# Patient Record
Sex: MALE | Race: WHITE | Employment: UNEMPLOYED | URBAN - METROPOLITAN AREA
[De-identification: names, ages, dates, MRNs, and addresses within clinical notes are randomized per-mention and may not be internally consistent; named-entity substitution may affect disease eponyms.]

---

## 2017-01-11 RX ORDER — OXYCODONE HYDROCHLORIDE 5 MG/1
5 TABLET ORAL
Qty: 60 TAB | Refills: 0 | Status: SHIPPED | OUTPATIENT
Start: 2017-01-11 | End: 2017-01-17 | Stop reason: SDUPTHER

## 2017-01-11 NOTE — TELEPHONE ENCOUNTER
Last Visit: 10/18/2016 with MD Burton Thao    Next Appointment: 01/17/2017 with MD Burton Thao   Previous Refill Encounters: 11/22/2016 per NP Moses Matthews #[id number]     Requested Prescriptions     Pending Prescriptions Disp Refills    oxyCODONE IR (ROXICODONE) 5 mg immediate release tablet 60 Tab 0     Sig: Take 1 Tab by mouth two (2) times daily as needed for Pain. Max Daily Amount: 10 mg.

## 2017-01-17 ENCOUNTER — OFFICE VISIT (OUTPATIENT)
Dept: ORTHOPEDIC SURGERY | Age: 52
End: 2017-01-17

## 2017-01-17 VITALS
RESPIRATION RATE: 18 BRPM | WEIGHT: 208.8 LBS | TEMPERATURE: 97.9 F | BODY MASS INDEX: 27.67 KG/M2 | SYSTOLIC BLOOD PRESSURE: 131 MMHG | OXYGEN SATURATION: 98 % | DIASTOLIC BLOOD PRESSURE: 77 MMHG | HEART RATE: 80 BPM | HEIGHT: 73 IN

## 2017-01-17 DIAGNOSIS — M96.1 LUMBAR POST-LAMINECTOMY SYNDROME: Primary | ICD-10-CM

## 2017-01-17 RX ORDER — DULOXETIN HYDROCHLORIDE 60 MG/1
60 CAPSULE, DELAYED RELEASE ORAL
Qty: 30 CAP | Refills: 3 | Status: SHIPPED | OUTPATIENT
Start: 2017-01-17

## 2017-01-17 RX ORDER — OXYCODONE HYDROCHLORIDE 5 MG/1
5 TABLET ORAL
Qty: 60 TAB | Refills: 0 | Status: SHIPPED | OUTPATIENT
Start: 2017-02-10 | End: 2017-01-17 | Stop reason: SDUPTHER

## 2017-01-17 RX ORDER — CARISOPRODOL 350 MG/1
350 TABLET ORAL
Qty: 90 TAB | Refills: 2 | Status: SHIPPED | OUTPATIENT
Start: 2017-01-17

## 2017-01-17 RX ORDER — OXYCODONE HYDROCHLORIDE 5 MG/1
5 TABLET ORAL
Qty: 60 TAB | Refills: 0 | Status: SHIPPED | OUTPATIENT
Start: 2017-04-11

## 2017-01-17 RX ORDER — OXYCODONE HYDROCHLORIDE 5 MG/1
5 TABLET ORAL
Qty: 60 TAB | Refills: 0 | Status: SHIPPED | OUTPATIENT
Start: 2017-03-12

## 2017-01-17 NOTE — PATIENT INSTRUCTIONS
Learning About How to Have a Healthy Back  What causes back pain? Back pain is often caused by overuse, strain, or injury. For example, people often hurt their backs playing sports or working in the yard, being jolted in a car accident, or lifting something too heavy. Aging plays a part too. Your bones and muscles tend to lose strength as you age, which makes injury more likely. The spongy discs between the bones of the spine (vertebrae) may suffer from wear and tear and no longer provide enough cushion between the bones. A disc that bulges or breaks open (herniated disc) can press on nerves, causing back pain. In some people, back pain is the result of arthritis, broken vertebrae caused by bone loss (osteoporosis), illness, or a spine problem. Although most people have back pain at one time or another, there are steps you can take to make it less likely. How can you have a healthy back? Reduce stress on your back through good posture  Slumping or slouching alone may not cause low back pain. But after the back has been strained or injured, bad posture can make pain worse. · Sleep in a position that maintains your back's normal curves and on a mattress that feels comfortable. Sleep on your side with a pillow between your knees, or sleep on your back with a pillow under your knees. These positions can reduce strain on your back. · Stand and sit up straight. \"Good posture\" generally means your ears, shoulders, and hips are in a straight line. · If you must stand for a long time, put one foot on a stool, ledge, or box. Switch feet every now and then. · Sit in a chair that is low enough to let you place both feet flat on the floor with both knees nearly level with your hips. If your chair or desk is too high, use a footrest to raise your knees. Place a small pillow, a rolled-up towel, or a lumbar roll in the curve of your back if you need extra support.   · Try a kneeling chair, which helps tilt your hips forward. This takes pressure off your lower back. · Try sitting on an exercise ball. It can rock from side to side, which helps keep your back loose. · When driving, keep your knees nearly level with your hips. Sit straight, and drive with both hands on the steering wheel. Your arms should be in a slightly bent position. Reduce stress on your back through careful lifting  · Squat down, bending at the hips and knees only. If you need to, put one knee to the floor and extend your other knee in front of you, bent at a right angle (half kneeling). · Press your chest straight forward. This helps keep your upper back straight while keeping a slight arch in your low back. · Hold the load as close to your body as possible, at the level of your belly button (navel). · Use your feet to change direction, taking small steps. · Lead with your hips as you change direction. Keep your shoulders in line with your hips as you move. · Set down your load carefully, squatting with your knees and hips only. Exercise and stretch your back  · Do some exercise on most days of the week, if your doctor says it is okay. You can walk, run, swim, or cycle. · Stretch your back muscles. Here are a few exercises to try:  Garjavier Cookver on your back, and gently pull one bent knee to your chest. Put that foot back on the floor, and then pull the other knee to your chest.  ¨ Do pelvic tilts. Lie on your back with your knees bent. Tighten your stomach muscles. Pull your belly button (navel) in and up toward your ribs. You should feel like your back is pressing to the floor and your hips and pelvis are slightly lifting off the floor. Hold for 6 seconds while breathing smoothly. ¨ Sit with your back flat against a wall. · Keep your core muscles strong. The muscles of your back, belly (abdomen), and buttocks support your spine. ¨ Pull in your belly and imagine pulling your navel toward your spine. Hold this for 6 seconds, then relax.  Remember to keep breathing normally as you tense your muscles. ¨ Do curl-ups. Always do them with your knees bent. Keep your low back on the floor, and curl your shoulders toward your knees using a smooth, slow motion. Keep your arms folded across your chest. If this bothers your neck, try putting your hands behind your neck (not your head), with your elbows spread apart. ¨ Lie on your back with your knees bent and your feet flat on the floor. Tighten your belly muscles, and then push with your feet and raise your buttocks up a few inches. Hold this position 6 seconds as you continue to breathe normally, then lower yourself slowly to the floor. Repeat 8 to 12 times. ¨ If you like group exercise, try Pilates or yoga. These classes have poses that strengthen the core muscles. Lead a healthy lifestyle  · Stay at a healthy weight to avoid strain on your back. · Do not smoke. Smoking increases the risk of osteoporosis, which weakens the spine. If you need help quitting, talk to your doctor about stop-smoking programs and medicines. These can increase your chances of quitting for good. Where can you learn more? Go to http://efrain-tiny.info/. Enter L315 in the search box to learn more about \"Learning About How to Have a Healthy Back. \"  Current as of: May 23, 2016  Content Version: 11.1  © 8869-2615 [employer]. Care instructions adapted under license by [employer] (which disclaims liability or warranty for this information). If you have questions about a medical condition or this instruction, always ask your healthcare professional. [place] [address] any warranty or liability for your use of this information.

## 2017-01-17 NOTE — PROGRESS NOTES
[address]  [address]  [place]  Phone: [phone]  Fax: [phone]  PROGRESS NOTE  Patient: Joseluis Woods                MRN: [id number]       SSN: [id number]  YOB: 1965        AGE: 46 y.o. SEX: male  Body mass index is 27.55 kg/(m^2). PCP: Williams Gomez MD  01/17/17    Chief Complaint   Patient presents with    Back Pain     3 month follow up       [facility], RADIOGRAPHS, and PLAN:     HISTORY:  Mr. Mary Nice returns today. He completed his functional capacity evaluation, which demonstrated excellent effort and the capacity to do heavy work, just not the degree of heavy work required by his job. I have given him a work release for heavy work at a 60-pound lifting limit. He continues on his narcotic pain medication. We have gotten him down to two pills a day. He still has days where he feels he requires more than that, but, on average, we got him down to two pills a day and will try to continue to wean him down on his narcotics over time. We do not like to provide chronic narcotic pain medications, and if the weaning process is not successful, we may have him in to chronic pain management. In regards to his work status, he has a degree in IT, and I think that would be best for him. I probably would recommend vocational rehabilitation to try to get him job placement in the IT field. That would be ideal for him and most easily placed within his job work capacity. We will see him again as-needed for his medication management.      cc:  Workers Compensation        Past Medical History   Diagnosis Date    Back pain     Low back pain     Lumbar postlaminectomy syndrome        Family History   Problem Relation Age of Onset    Diabetes Father        Current Outpatient Prescriptions   Medication Sig Dispense Refill    diclofenac (FLECTOR) 1.3 % pt12 1 Patch by TransDERmal route every twelve (12) hours every twelve (12) hours. 60 Patch 2    naproxen (NAPROSYN) 500 mg tablet take 1 tablet by mouth twice a day  0    DULoxetine (CYMBALTA) 60 mg capsule Take 1 Cap by mouth nightly. Indications: NEUROPATHIC PAIN 30 Cap 3    pregabalin (LYRICA) 150 mg capsule Take 1 Cap by mouth two (2) times a day. Max Daily Amount: 300 mg. 60 Cap 0    CIALIS 20 mg tablet   0    carisoprodol (SOMA) 350 mg tablet Take 1 Tab by mouth every eight (8) hours as needed for Muscle Spasm(s). Max Daily Amount: 1,050 mg. Indications: MUSCLE SPASM 90 Tab 2    topiramate (TOPAMAX) 50 mg tablet Take 1 Tab by mouth two (2) times a day. 60 Tab 2    VIAGRA 100 mg tablet   6    DULoxetine (CYMBALTA) 60 mg capsule Take 1 Cap by mouth daily. 30 Cap 2    oxyCODONE (OXYIR) 5 mg capsule 1 tab PO [id number] prn pain. .. DNF 4-29-16 90 Cap 0    DULoxetine (CYMBALTA) 60 mg capsule Take 1 Cap by mouth daily. 30 Cap 2    oxyCODONE IR (ROXICODONE) 10 mg tab immediate release tablet Take 0.5 Tabs by mouth every eight (8) hours as needed for Pain. Max Daily Amount: 15 mg. 90 Tab 0    oxyCODONE-acetaminophen (PERCOCET) 5-325 mg per tablet Take 1 Tab by mouth three (3) times daily as needed for Pain.  methylphenidate (RITALIN) 10 mg tablet Take  by mouth daily.  DULoxetine (CYMBALTA) 20 mg capsule Take 20 mg by mouth daily. Indications: CHRONIC MUSCULOSKELETAL PAIN      diclofenac (FLECTOR) 1.3 % pt12 transdermal patch 1 Patch by TransDERmal route every twelve (12) hours every twelve (12) hours.  oxyCODONE IR (ROXICODONE) 5 mg immediate release tablet Take 1 Tab by mouth two (2) times daily as needed for Pain. Max Daily Amount: 10 mg. 60 Tab 0    carisoprodol (SOMA) 350 mg tablet Take 1 Tab by mouth every eight (8) hours as needed for Muscle Spasm(s). Max Daily Amount: 1,050 mg. 90 Tab 2    DULoxetine (CYMBALTA) 30 mg capsule Take 1 Cap by mouth daily.  Indications: NEUROPATHIC PAIN 30 Cap 3    pregabalin (LYRICA) 75 mg capsule Take 1 Cap by mouth two (2) times a day. Max Daily Amount: 150 mg. 14 Cap 0    oxyCODONE IR (ROXICODONE) 5 mg immediate release tablet Take 1 Tab by mouth every four (4) hours as needed for Pain. Max Daily Amount: 30 mg. 90 Tab 0    oxyCODONE (OXYIR) 5 mg capsule Take 1 Cap by mouth every four (4) hours as needed. Max Daily Amount: 30 mg. 90 Cap 0    oxyCODONE (OXYIR) 5 mg capsule 1 tab PO [id number] prn pain. .. DNF 3-20-16 90 Cap 0    oxyCODONE (OXYIR) 5 mg capsule 1 tab PO [id number] prn pain. .. DNF 5-28-16 90 Cap 0    oxyCODONE IR (ROXICODONE) 5 mg immediate release tablet Take 1 Tab by mouth every eight (8) hours as needed for Pain. Max Daily Amount: 15 mg. 90 Tab 0    diclofenac (FLECTOR) 1.3 % pt12 transdermal patch 1 Patch by TransDERmal route every twelve (12) hours. 60 Patch 2       Allergies   Allergen Reactions    Codeine Anaphylaxis    Tylenol [Acetaminophen] Anaphylaxis    Penicillins Rash       Past Surgical History   Procedure Laterality Date    Hx orthopaedic Right L5 Laminectomy     2011       Past Medical History   Diagnosis Date    Back pain     Low back pain     Lumbar postlaminectomy syndrome        Social History     Social History    Marital status: LEGALLY      Spouse name: N/A    Number of children: N/A    Years of education: N/A     Occupational History    Not on file. Social History Main Topics    Smoking status: Former Smoker    Smokeless tobacco: Never Used    Alcohol use Yes      Comment: occasionally    Drug use: No    Sexual activity: Not on file     Other Topics Concern    Not on file     Social History Narrative       REVIEW OF SYSTEMS:   CONSTITUTIONAL SYMPTOMS:  Negative. EYES:  Negative. EARS, NOSE, THROAT AND MOUTH:  Negative. CARDIOVASCULAR:  Negative. RESPIRATORY:  Negative. GENITOURINARY: Negative. GASTROINTESTINAL:  Negative. INTEGUMENTARY (SKIN AND/OR BREAST):  Negative.    MUSCULOSKELETAL: Per HPI.   ENDOCRINE/RHEUMATOLOGIC: Negative. NEUROLOGICAL:  Per HPI. HEMATOLOGIC/LYMPHATIC:  Negative. ALLERGIC/IMMUNOLOGIC:  Negative. PSYCHIATRIC:  Negative. PHYSICAL EXAMINATION:   Visit Vitals    /77    Pulse 80    Temp 97.9 °F (36.6 °C) (Oral)    Resp 18    Ht 6' 1\" (1.854 m)    Wt 208 lb 12.8 oz (94.7 kg)    SpO2 98%    BMI 27.55 kg/m2    PAIN SCALE: 8/10    CONSTITUTIONAL: The patient is in no apparent distress and is alert and oriented x 3. HEENT: Normocephalic. Hearing grossly intact. NECK: Supple and symmetric. no tenderness, or masses were felt. RESPIRATORY: No labored breathing. CARDIOVASCULAR: The carotid pulses were normal. Peripheral pulses were 2+. CHEST: Normal AP diameter and normal contour without any kyphoscoliosis. LYMPHATIC: No lymphadenopathy was appreciated in the neck, axillae or groin. SKIN:  Negative for scars, rashes, lesions, or ulcers on the right upper, right lower, left upper, left lower and trunk. NEUROLOGICAL: Alert and oriented x 3. Ambulation without assistive device. FWB. EXTREMITIES: See musculoskeletal.  MUSCULOSKELETAL:   Head and Neck:  Negative for misalignment, asymmetry, crepitation, defects, tenderness masses or effusions.  Left Upper Extremity: Inspection, percussion and palpation preformed. Vergaras sign is negative.  Right Upper Extremity: Inspection, percussion and palpation preformed. Vergaras sign is negative.  Spine, Ribs and Pelvis: Inspection, percussion and palpation preformed. Negative for misalignment, asymmetry, crepitation, defects, tenderness masses or effusions.  Right Lower Extremity: Inspection, percussion and palpation preformed. Negative straight leg raise.  Left Lower Extremity: Inspection, percussion and palpation preformed. Negative straight leg raise.      Hip Flex  Quads Hamstrings Tib-Ant EHL Ankle PF/DF   Right +4/5 +4/5 +4/5 +4/5 +4/5 +4/5   Left +4/5 +4/5 +4/5 +4/5 +4/5 +4/5     SPINE EXAM:     Lumbar spine: No rash, ecchymosis, or gross obliquity. No focal atrophy is noted.        ASSESSMENT    ICD-10-CM ICD-9-CM    1. Lumbar post-laminectomy syndrome M96.1 722.83        Written by Stiven Espana, as dictated by Angela Espana MD.

## 2017-01-17 NOTE — MR AVS SNAPSHOT
Visit Information Date & Time Provider Department Dept. Phone Encounter #  
 1/17/2017  8:45 AM Jadiel Varela  [facility], [address] [facility] [id number] Follow-up Instructions Follow-up and Disposition History Your Appointments 4/13/2017  9:20 AM  
Follow Up with Zay Lobo NP  
[facility] ([facility]) [address] [facility] [address]  
[phone]  
  
   
 [address] [facility] [address] Upcoming Health Maintenance Date Due Hepatitis C Screening 1965 DTaP/Tdap/Td series (1 - Tdap) 7/6/1984 FOBT Q 1 YEAR AGE 50-75 7/6/2013 INFLUENZA AGE 9 TO ADULT 8/1/2016 Allergies as of 1/17/2017  Review Complete On: 1/17/2017 By: Jadiel Varela MD  
  
 Severity Noted Reaction Type Reactions Codeine High 03/03/2016    Anaphylaxis Tylenol [Acetaminophen] High   Anaphylaxis Penicillins  03/12/2015    Rash Current Immunizations  Never Reviewed No immunizations on file. Not reviewed this visit You Were Diagnosed With   
  
 Codes Comments Lumbar post-laminectomy syndrome    -  Primary ICD-10-CM: M96.1 ICD-9-CM: 722.83 Vitals BP Pulse Temp Resp Height(growth percentile) Weight(growth percentile) 131/77 80 97.9 °F (36.6 °C) (Oral) 18 6' 1\" (1.854 m) 208 lb 12.8 oz (94.7 kg) SpO2 BMI Smoking Status 98% 27.55 kg/m2 Former Smoker BMI and BSA Data Body Mass Index Body Surface Area  
 27.55 kg/m 2 2.21 m 2 Preferred Pharmacy Pharmacy Name Phone [facility] [address] [phone] Your Updated Medication List  
  
   
This list is accurate as of: 1/17/17  9:29 AM.  Always use your most recent med list.  
  
  
  
  
 * carisoprodol 350 mg tablet Commonly known as:  SOMA Take 1 Tab by mouth every eight (8) hours as needed for Muscle Spasm(s). Max Daily Amount: 1,050 mg. Indications: MUSCLE SPASM * carisoprodol 350 mg tablet Commonly known as:  SOMA Take 1 Tab by mouth every eight (8) hours as needed for Muscle Spasm(s). Max Daily Amount: 1,050 mg. CIALIS 20 mg tablet Generic drug:  tadalafil * CYMBALTA 20 mg capsule Generic drug:  DULoxetine Take 20 mg by mouth daily. Indications: CHRONIC MUSCULOSKELETAL PAIN  
  
 * DULoxetine 60 mg capsule Commonly known as:  CYMBALTA Take 1 Cap by mouth daily. * DULoxetine 60 mg capsule Commonly known as:  CYMBALTA Take 1 Cap by mouth daily. * DULoxetine 30 mg capsule Commonly known as:  CYMBALTA Take 1 Cap by mouth daily. Indications: NEUROPATHIC PAIN  
  
 * DULoxetine 60 mg capsule Commonly known as:  CYMBALTA Take 1 Cap by mouth nightly. Indications: NEUROPATHIC PAIN  
  
 * diclofenac 1.3 % Pt12 Commonly known as:  FLECTOR  
1 Patch by TransDERmal route every twelve (12) hours every twelve (12) hours. * diclofenac 1.3 % Pt12 Commonly known as:  FLECTOR  
1 Patch by TransDERmal route every twelve (12) hours. * diclofenac 1.3 % Pt12 Commonly known as:  FLECTOR  
1 Patch by TransDERmal route every twelve (12) hours every twelve (12) hours. naproxen 500 mg tablet Commonly known as:  NAPROSYN  
take 1 tablet by mouth twice a day * oxyCODONE IR 5 mg immediate release tablet Commonly known as:  Karalee Ferries Take 1 Tab by mouth every eight (8) hours as needed for Pain. Max Daily Amount: 15 mg.  
  
 * oxyCODONE IR 10 mg Tab immediate release tablet Commonly known as:  Karalee Ferries Take 0.5 Tabs by mouth every eight (8) hours as needed for Pain. Max Daily Amount: 15 mg.  
  
 * oxyCODONE 5 mg capsule Commonly known as:  OXYIR  
1 tab PO [id number] prn pain. .. DNF 4-29-16 * oxyCODONE 5 mg capsule Commonly known as:  OXYIR  
 1 tab PO [id number] prn pain. .. DNF 5-28-16 * oxyCODONE 5 mg capsule Commonly known as:  OXYIR  
1 tab PO [id number] prn pain. .. DNF 3-20-16 * oxyCODONE IR 5 mg immediate release tablet Commonly known as:  Royetta Mariel Take 1 Tab by mouth every four (4) hours as needed for Pain. Max Daily Amount: 30 mg.  
  
 * oxyCODONE 5 mg capsule Commonly known as:  OXYIR Take 1 Cap by mouth every four (4) hours as needed. Max Daily Amount: 30 mg.  
  
 * oxyCODONE IR 5 mg immediate release tablet Commonly known as:  Royetta Mariel Take 1 Tab by mouth two (2) times daily as needed for Pain. Max Daily Amount: 10 mg. Start taking on:  3/12/2017 * oxyCODONE IR 5 mg immediate release tablet Commonly known as:  Royetta Mariel Take 1 Tab by mouth two (2) times daily as needed for Pain. Max Daily Amount: 10 mg. Start taking on:  4/11/2017  
  
 oxyCODONE-acetaminophen 5-325 mg per tablet Commonly known as:  PERCOCET Take 1 Tab by mouth three (3) times daily as needed for Pain. * pregabalin 75 mg capsule Commonly known as:  Walda Smoker Take 1 Cap by mouth two (2) times a day. Max Daily Amount: 150 mg.  
  
 * pregabalin 150 mg capsule Commonly known as:  Walda Smoker Take 1 Cap by mouth two (2) times a day. Max Daily Amount: 300 mg. RITALIN 10 mg tablet Generic drug:  methylphenidate Take  by mouth daily. topiramate 50 mg tablet Commonly known as:  TOPAMAX Take 1 Tab by mouth two (2) times a day. VIAGRA 100 mg tablet Generic drug:  sildenafil citrate * Notice: This list has 21 medication(s) that are the same as other medications prescribed for you. Read the directions carefully, and ask your doctor or other care provider to review them with you. Prescriptions Printed Refills  
 carisoprodol (SOMA) 350 mg tablet 2 Sig: Take 1 Tab by mouth every eight (8) hours as needed for Muscle Spasm(s). Max Daily Amount: 1,050 mg.  
 Class: Print  Route: Oral  
 oxyCODONE IR (ROXICODONE) 5 mg immediate release tablet 0 Starting on: 3/12/2017 Sig: Take 1 Tab by mouth two (2) times daily as needed for Pain. Max Daily Amount: 10 mg.  
 Class: Print Route: Oral  
 oxyCODONE IR (ROXICODONE) 5 mg immediate release tablet 0 Starting on: 4/11/2017 Sig: Take 1 Tab by mouth two (2) times daily as needed for Pain. Max Daily Amount: 10 mg.  
 Class: Print Route: Oral  
  
Prescriptions Sent to Pharmacy Refills DULoxetine (CYMBALTA) 60 mg capsule 3 Sig: Take 1 Cap by mouth nightly. Indications: NEUROPATHIC PAIN Class: Normal  
 Pharmacy: [facility] [address]  #: [phone] Route: Oral  
  
Patient Instructions Learning About How to Have a Healthy Back What causes back pain? Back pain is often caused by overuse, strain, or injury. For example, people often hurt their backs playing sports or working in the yard, being jolted in a car accident, or lifting something too heavy. Aging plays a part too. Your bones and muscles tend to lose strength as you age, which makes injury more likely. The spongy discs between the bones of the spine (vertebrae) may suffer from wear and tear and no longer provide enough cushion between the bones. A disc that bulges or breaks open (herniated disc) can press on nerves, causing back pain. In some people, back pain is the result of arthritis, broken vertebrae caused by bone loss (osteoporosis), illness, or a spine problem. Although most people have back pain at one time or another, there are steps you can take to make it less likely. How can you have a healthy back? Reduce stress on your back through good posture Slumping or slouching alone may not cause low back pain. But after the back has been strained or injured, bad posture can make pain worse.  
· Sleep in a position that maintains your back's normal curves and on a mattress that feels comfortable. Sleep on your side with a pillow between your knees, or sleep on your back with a pillow under your knees. These positions can reduce strain on your back. · Stand and sit up straight. \"Good posture\" generally means your ears, shoulders, and hips are in a straight line. · If you must stand for a long time, put one foot on a stool, ledge, or box. Switch feet every now and then. · Sit in a chair that is low enough to let you place both feet flat on the floor with both knees nearly level with your hips. If your chair or desk is too high, use a footrest to raise your knees. Place a small pillow, a rolled-up towel, or a lumbar roll in the curve of your back if you need extra support. · Try a kneeling chair, which helps tilt your hips forward. This takes pressure off your lower back. · Try sitting on an exercise ball. It can rock from side to side, which helps keep your back loose. · When driving, keep your knees nearly level with your hips. Sit straight, and drive with both hands on the steering wheel. Your arms should be in a slightly bent position. Reduce stress on your back through careful lifting · Squat down, bending at the hips and knees only. If you need to, put one knee to the floor and extend your other knee in front of you, bent at a right angle (half kneeling). · Press your chest straight forward. This helps keep your upper back straight while keeping a slight arch in your low back. · Hold the load as close to your body as possible, at the level of your belly button (navel). · Use your feet to change direction, taking small steps. · Lead with your hips as you change direction. Keep your shoulders in line with your hips as you move. · Set down your load carefully, squatting with your knees and hips only. Exercise and stretch your back · Do some exercise on most days of the week, if your doctor says it is okay. You can walk, run, swim, or cycle. · Stretch your back muscles. Here are a few exercises to try: ¨ Lie on your back, and gently pull one bent knee to your chest. Put that foot back on the floor, and then pull the other knee to your chest. 
¨ Do pelvic tilts. Lie on your back with your knees bent. Tighten your stomach muscles. Pull your belly button (navel) in and up toward your ribs. You should feel like your back is pressing to the floor and your hips and pelvis are slightly lifting off the floor. Hold for 6 seconds while breathing smoothly. ¨ Sit with your back flat against a wall. · Keep your core muscles strong. The muscles of your back, belly (abdomen), and buttocks support your spine. ¨ Pull in your belly and imagine pulling your navel toward your spine. Hold this for 6 seconds, then relax. Remember to keep breathing normally as you tense your muscles. ¨ Do curl-ups. Always do them with your knees bent. Keep your low back on the floor, and curl your shoulders toward your knees using a smooth, slow motion. Keep your arms folded across your chest. If this bothers your neck, try putting your hands behind your neck (not your head), with your elbows spread apart. ¨ Lie on your back with your knees bent and your feet flat on the floor. Tighten your belly muscles, and then push with your feet and raise your buttocks up a few inches. Hold this position 6 seconds as you continue to breathe normally, then lower yourself slowly to the floor. Repeat 8 to 12 times. ¨ If you like group exercise, try Pilates or yoga. These classes have poses that strengthen the core muscles. Lead a healthy lifestyle · Stay at a healthy weight to avoid strain on your back. · Do not smoke. Smoking increases the risk of osteoporosis, which weakens the spine. If you need help quitting, talk to your doctor about stop-smoking programs and medicines. These can increase your chances of quitting for good. Where can you learn more? Go to http://efrain-tiny.info/. Enter L315 in the search box to learn more about \"Learning About How to Have a Healthy Back. \" Current as of: May 23, 2016 Content Version: 11.1 © 1543-0371 [employer]. Care instructions adapted under license by [employer] (which disclaims liability or warranty for this information). If you have questions about a medical condition or this instruction, always ask your healthcare professional. [address] any warranty or liability for your use of this information. Patient Instructions History Introducing [facility]! Cassia Tamayo introduces Gazelle patient portal. Now you can access parts of your medical record, email your doctor's office, and request medication refills online. 1. In your internet browser, go to [email or web address] 2. Click on the First Time User? Click Here link in the Sign In box. You will see the New Member Sign Up page. 3. Enter your Gazelle Access Code exactly as it appears below. You will not need to use this code after youve completed the sign-up process. If you do not sign up before the expiration date, you must request a new code. · Gazelle Access Code: [email or web address] Expires: 4/17/2017  8:35 AM 
 
4. Enter the last four digits of your Social Security Number (xxxx) and Date of Birth (mm/dd/yyyy) as indicated and click Submit. You will be taken to the next sign-up page. 5. Create a myeasydocst ID. This will be your Gazelle login ID and cannot be changed, so think of one that is secure and easy to remember. 6. Create a Gazelle password. You can change your password at any time. 7. Enter your Password Reset Question and Answer. This can be used at a later time if you forget your password. 8. Enter your e-mail address. You will receive e-mail notification when new information is available in [address] 9. Click Sign Up. You can now view and download portions of your medical record. 10. Click the Download Summary menu link to download a portable copy of your medical information. If you have questions, please visit the Frequently Asked Questions section of the "[employer] website. Remember, "[employer] is NOT to be used for urgent needs. For medical emergencies, dial 911. Now available from your iPhone and Android! Please provide this summary of care documentation to your next provider. Your primary care clinician is listed as Anupama Epps. If you have any questions after today's visit, please call [phone].

## 2017-01-17 NOTE — LETTER
1/17/2017 9:10 AM 
 
Mr. Alyssa Cooper 
[phone] [place] [phone] To Whom It May Concern: 
 
Alyssa Cooper is currently under the care of [facility]. 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 If there are questions or concerns please have the patient contact our office.  
 
 
 
Sincerely, 
 
 
 
 
 
Michele Reyes MD

## 2017-04-13 ENCOUNTER — OFFICE VISIT (OUTPATIENT)
Dept: ORTHOPEDIC SURGERY | Age: 52
End: 2017-04-13

## 2017-04-13 VITALS
HEIGHT: 73 IN | SYSTOLIC BLOOD PRESSURE: 107 MMHG | TEMPERATURE: 97.8 F | OXYGEN SATURATION: 99 % | DIASTOLIC BLOOD PRESSURE: 60 MMHG | WEIGHT: 202.4 LBS | BODY MASS INDEX: 26.83 KG/M2 | HEART RATE: 81 BPM | RESPIRATION RATE: 18 BRPM

## 2017-04-13 DIAGNOSIS — M96.1 LUMBAR POST-LAMINECTOMY SYNDROME: Primary | ICD-10-CM

## 2017-04-13 DIAGNOSIS — Z51.81 THERAPEUTIC DRUG MONITORING: ICD-10-CM

## 2017-04-13 DIAGNOSIS — M96.1 LUMBAR POST-LAMINECTOMY SYNDROME: ICD-10-CM

## 2017-04-13 DIAGNOSIS — M51.26 HERNIATED NUCLEUS PULPOSUS, L4-5 RIGHT: ICD-10-CM

## 2017-04-13 RX ORDER — DICLOFENAC EPOLAMINE 0.01 G/1
1 PATCH TOPICAL EVERY 12 HOURS
Qty: 60 PATCH | Refills: 2 | Status: SHIPPED | OUTPATIENT
Start: 2017-04-13

## 2017-04-13 RX ORDER — DULOXETIN HYDROCHLORIDE 30 MG/1
30 CAPSULE, DELAYED RELEASE ORAL DAILY
Qty: 30 CAP | Refills: 2 | Status: SHIPPED | OUTPATIENT
Start: 2017-04-13

## 2017-04-13 NOTE — PROGRESS NOTES
Chief complaint/History of Present Illness:  Chief Complaint   Patient presents with    Back Pain     3 month follow up     HPI  Dafne Senior is a  46 y.o.  male      HISTORY OF PRESENT ILLNESS:  The patient comes in today for followup of his chronic back pain. He gets pain that radiates to his left lower extremity. He is on Cymbalta 60 mg daily, which does help some. He is also on Soma twice a day and Roxicodone IR 5 mg three twice a day. 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 He does not have to do any heavy lifting, but the standing does aggravate his pain. He denies any fever or bowel or bladder dysfunction. He does ride a motorcycle. His SCV did show he could do heavy work up to 60 pounds. He is a Workers Compensation injury from years ago. 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 However, he has not been able to find a job in that yet, but he is still looking. PHYSICAL EXAM:  Mr. Олег Don is a 78-year-old male. He is alert and oriented. He has a full weight bearing, non-antalgic gait, 5/5 strength of the bilateral lower extremities, and negative straight leg raise. ASSESSENT/PLAN:  This is a patient with post lumbar laminectomy syndrome. He has not been able to get off the Oxy IR like we were hoping, so we are going to refer him to pain management at this time. We will get a UDS today. His last dose was last Thursday, so it is not really going to show up in his system, but we will go ahead and get the UDS regardless. He has a refill of Roxicodone waiting for him at the pharmacy, so we are not going to give him one today. We are refilling his Cymbalta and his Diclofenac patches. They do seem to help. We are going to refer him to Dr. Sophie Case. The patient lives in [place], so that should be a good place for him to go.   If he is unable to get into pain management within three months, we will see him back in three months. Review of systems:    Past Medical History:   Diagnosis Date    Back pain     Low back pain     Lumbar postlaminectomy syndrome      Past Surgical History:   Procedure Laterality Date    HX ORTHOPAEDIC Right L5 Laminectomy    2011     Social History     Social History    Marital status: LEGALLY      Spouse name: N/A    Number of children: N/A    Years of education: N/A     Occupational History    Not on file. Social History Main Topics    Smoking status: Former Smoker    Smokeless tobacco: Never Used    Alcohol use Yes      Comment: occasionally    Drug use: No    Sexual activity: Not on file     Other Topics Concern    Not on file     Social History Narrative     Family History   Problem Relation Age of Onset    Diabetes Father        Physical Exam:  Visit Vitals    /60    Pulse 81    Temp 97.8 °F (36.6 °C) (Oral)    Resp 18    Ht 6' 1\" (1.854 m)    Wt 202 lb 6.4 oz (91.8 kg)    SpO2 99%    BMI 26.7 kg/m2     Pain Scale: 6/10     has been . reviewed and is appropriate    Pt has a consistent UDS in the record      Myranda Olsen was seen today for back pain. Diagnoses and all orders for this visit:    Lumbar post-laminectomy syndrome  -     REFERRAL TO PAIN MANAGEMENT  -     DRUG SCREEN UR - W/ CONFIRM; Future    Therapeutic drug monitoring  -     REFERRAL TO PAIN MANAGEMENT  -     DRUG SCREEN UR - W/ CONFIRM; Future    Herniated nucleus pulposus, L4-5 right  -     diclofenac (FLECTOR) 1.3 % pt12; 1 Patch by TransDERmal route every twelve (12) hours every twelve (12) hours. Other orders  -     DULoxetine (CYMBALTA) 30 mg capsule; Take 1 Cap by mouth daily. Indications: NEUROPATHIC PAIN            Follow-up Disposition:  Return in about 3 months (around 7/13/2017) for with [address] [place] [address].         We have informed [place] to notify us for immediate appointment if he has any worsening neurogical symptoms or if an emergency situation presents, then call 911

## 2017-04-18 ENCOUNTER — TELEPHONE (OUTPATIENT)
Dept: ORTHOPEDIC SURGERY | Age: 52
End: 2017-04-18

## 2017-04-18 NOTE — TELEPHONE ENCOUNTER
Received notice from his W/C that the referral to Pain Management has been denied. His claim from W/C has been Pema dolan on a full and final basis. No further medical will be paid under this file. \" This was received from [facility] from [facility]

## 2017-06-27 NOTE — TELEPHONE ENCOUNTER
Last Visit: 04/13/2017 with NP Aaron Domínguez    Next Appointment: noted to f/u in 3 months if unable to get into pain management  Previous Refill Encounters: 04/11/2017 per MD Kong Pizarro #[id number]     Requested Prescriptions     Pending Prescriptions Disp Refills    oxyCODONE IR (ROXICODONE) 5 mg immediate release tablet 60 Tab 0     Sig: Take 1 Tab by mouth two (2) times daily as needed for Pain. Max Daily Amount: 10 mg.

## 2017-06-28 RX ORDER — OXYCODONE HYDROCHLORIDE 5 MG/1
5 TABLET ORAL
Qty: 60 TAB | Refills: 0 | OUTPATIENT
Start: 2017-06-28

## 2019-09-05 ENCOUNTER — TELEPHONE (OUTPATIENT)
Dept: ORTHOPEDIC SURGERY | Age: 54
End: 2019-09-05

## 2019-09-05 NOTE — TELEPHONE ENCOUNTER
Patient called stating that he needs a letter for his new job that out of town stating that he has been prescribed Cymbalta, Oxycodone, Oxycodone IR, and Carisoprodol in the past. He said needs this note because he is having a drug screening test that goes back a couple years and just wants to be safe if the test comes up with any of the medication. He needs this completed by today so he can pick it up. Please advise patient at [phone] when ready for .

## 2025-05-05 NOTE — TELEPHONE ENCOUNTER
FYI    The patient states he is awaiting his insurance to take effect prior to scheduling with pain management. He recently started a new job and coverage will not take effect until 90 days after his hire date. I advised the patient per new federal guidelines on opioids, an evaluation is required prior to refills. Patient verbalized understanding. [No Acute Distress] : no acute distress [Well Nourished] : well nourished [Well Developed] : well developed [Well-Appearing] : well-appearing [Normal Sclera/Conjunctiva] : normal sclera/conjunctiva [PERRL] : pupils equal round and reactive to light [EOMI] : extraocular movements intact [Normal Outer Ear/Nose] : the outer ears and nose were normal in appearance [Normal Oropharynx] : the oropharynx was normal [No JVD] : no jugular venous distention [No Lymphadenopathy] : no lymphadenopathy [Supple] : supple [No Respiratory Distress] : no respiratory distress  [No Accessory Muscle Use] : no accessory muscle use [Clear to Auscultation] : lungs were clear to auscultation bilaterally [Normal Rate] : normal rate  [Regular Rhythm] : with a regular rhythm [Normal S1, S2] : normal S1 and S2 [No Carotid Bruits] : no carotid bruits [No Abdominal Bruit] : a ~M bruit was not heard ~T in the abdomen [No Varicosities] : no varicosities [No Edema] : there was no peripheral edema [No Palpable Aorta] : no palpable aorta [No Extremity Clubbing/Cyanosis] : no extremity clubbing/cyanosis [Soft] : abdomen soft [Non Tender] : non-tender [Non-distended] : non-distended [No HSM] : no HSM [Normal Bowel Sounds] : normal bowel sounds [Normal Posterior Cervical Nodes] : no posterior cervical lymphadenopathy [Normal Anterior Cervical Nodes] : no anterior cervical lymphadenopathy [No CVA Tenderness] : no CVA  tenderness [No Spinal Tenderness] : no spinal tenderness [No Joint Swelling] : no joint swelling [Grossly Normal Strength/Tone] : grossly normal strength/tone [No Rash] : no rash [Coordination Grossly Intact] : coordination grossly intact [No Focal Deficits] : no focal deficits [Normal Gait] : normal gait [Normal Affect] : the affect was normal [Normal Insight/Judgement] : insight and judgment were intact